# Patient Record
(demographics unavailable — no encounter records)

---

## 2024-11-13 NOTE — PHYSICAL EXAM

## 2024-11-13 NOTE — CARDIOLOGY SUMMARY
[de-identified] : 6/19/2024 - sinus rhythm 72 bpm, RBBB [de-identified] : On 2/11/2024 he had a nuclear stress test which was notable for moderate inferior and anteroseptal ischemia. [de-identified] : On 2/7/2024 Edgar Vallejo had a transthoracic echocardiogram which showed normal left ventricular systolic function with no wall motion abnormality, mild mitral regurgitation, mild tricuspid regurgitation, mitral valve prolapse, and a minimal pericardial effusion. [de-identified] : 10/22/2024. [facility].  Conclusions:  Diagnostic cath showed a severe lesion in the prox Diag branch. The vessel was overall small, however, given the patients angina POBA was performed with improved flow. The LAD had moderate disease that was assessed with iFR and determined to be non-significant at this time. The LVEDP during the case was 12 mmHg.

## 2024-11-13 NOTE — END OF VISIT
[Time Spent: ___ minutes] : I have spent [unfilled] minutes of time on the encounter which excludes teaching and separately reported services. [FreeTextEntry3] : I, To Stearns MD, personally performed the evaluation and management (E/M) services for this new patient. That E/M includes conducting the clinically appropriate initial history &/or exam, assessing all conditions, and establishing the plan of care. Today, Ilda Matta NP was here to observe my evaluation and management service for this patient & follow plan of care established by me going forward.

## 2024-11-13 NOTE — CARDIOLOGY SUMMARY
[de-identified] : 6/19/2024 - sinus rhythm 72 bpm, RBBB [de-identified] : On 2/11/2024 he had a nuclear stress test which was notable for moderate inferior and anteroseptal ischemia. [de-identified] : On 2/7/2024 Edgar Vallejo had a transthoracic echocardiogram which showed normal left ventricular systolic function with no wall motion abnormality, mild mitral regurgitation, mild tricuspid regurgitation, mitral valve prolapse, and a minimal pericardial effusion. [de-identified] : 10/22/2024. [facility].  Conclusions:  Diagnostic cath showed a severe lesion in the prox Diag branch. The vessel was overall small, however, given the patients angina POBA was performed with improved flow. The LAD had moderate disease that was assessed with iFR and determined to be non-significant at this time. The LVEDP during the case was 12 mmHg.

## 2024-11-13 NOTE — DISCUSSION/SUMMARY
[FreeTextEntry1] : He is a 78-year-old man who presents today for pretransplant cardiac evaluation prior to potential renal transplant with known history as above.   He is now status post diagnostic cath which showed a severe lesion in the proximal diagonal branch. The vessel was overall small, however, given the patient's angina POBA was performed with improved flow. The LAD had moderate disease that was assessed with iFR and determined to be non-significant at this time. He will continue DAPT with clopidogrel and aspirin to complete 3 weeks. After 11/12/2024 he will discontinue clopidogrel and continue on aspirin monotherapy indefinitely. Continue high intensity statin therapy.   No further cardiac testing is required prior to transplant consideration.

## 2024-11-13 NOTE — PHYSICAL EXAM

## 2024-11-13 NOTE — REASON FOR VISIT
[Other: ____] : [unfilled] [Other: _____] : [unfilled] [FreeTextEntry1] : 11/6/2024 Edgar Vallejo returns today for scheduled follow up. On 10/22/2024 he had a left heart catheterization which showed a severe lesion in the proximal diagonal branch. The vessel was overall small, however, given the patient's angina POBA was performed with improved flow. The LAD had moderate disease that was assessed with iFR and determined to be non-significant at this time. DAPT to complete 3 weeks will be completed on 11/12/2024. As he returns today he is feeling generally well. He reports no issues with his recently started peritoneal dialysis. He denies any chest discomfort, shortness of breath, palpitations, lightheadedness or syncope and continues to take his medications as directed.

## 2024-11-13 NOTE — HISTORY OF PRESENT ILLNESS
[FreeTextEntry1] : Edgar Vallejo presents today for pretransplant cardiac evaluation prior to potential renal transplant.   He is a 78-year-old with known cardiac risk factors of coronary artery disease (status post PCI with RICK ), prior stroke (CVA 2 years ago), chronic hypertension, dyslipidemia, being a former smoker (COPD, former smoker, quit 30 years ago, previously 1-1.5 PPD x 14 years), diabetes mellitus (DM2 diagnosed in , on insulin since 3 years ago, A1c 6.2%) and chronic renal disease (PD catheter in place, to begin PD in the near future).   Surgical history includes PD catheter placement, 1969 appendectomy.   His father is . He had CVA. Age 75-76. His mother is . She  same age.  He has 5 brothers and 3 sisters. To his knowledge they are all healthy.  Born in [facility]. He is . He has one son.  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   For exercise he rides a stationary bike for 2 hours a day.  He gets lightheaded when he is hypoglycemic, but otherwise no chest discomfort, shortness of breath, palpitations, lightheadedness or syncope.   He reports a recent echocardiogram and stress test with his primary cardiologist.

## 2024-11-13 NOTE — HISTORY OF PRESENT ILLNESS
[FreeTextEntry1] : Edgar Vallejo presents today for pretransplant cardiac evaluation prior to potential renal transplant.   He is a 78-year-old with known cardiac risk factors of coronary artery disease (status post PCI with RICK ), prior stroke (CVA 2 years ago), chronic hypertension, dyslipidemia, being a former smoker (COPD, former smoker, quit 30 years ago, previously 1-1.5 PPD x 14 years), diabetes mellitus (DM2 diagnosed in , on insulin since 3 years ago, A1c 6.2%) and chronic renal disease (PD catheter in place, to begin PD in the near future).   Surgical history includes PD catheter placement, 1969 appendectomy.   His father is . He had CVA. Age 75-76. His mother is . She  same age.  He has 5 brothers and 3 sisters. To his knowledge they are all healthy.  Born in [facility]. He is . He has one son.  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   For exercise he rides a stationary bike for 2 hours a day.  He gets lightheaded when he is hypoglycemic, but otherwise no chest discomfort, shortness of breath, palpitations, lightheadedness or syncope.   He reports a recent echocardiogram and stress test with his primary cardiologist.

## 2024-12-31 NOTE — HISTORY OF PRESENT ILLNESS
[FreeTextEntry1] : Pt c/o  bilateral  leg  intermittent claudication at sev  blocks and nightly  margareth leg and foot nocturnal cramps\par  about   2-3x/night \par  Onset sev years \par  Intensity severe\par  pt states that it is affecting his sleep\par  Pt denies recent injury, travel\par  \par  Pt denies any recent  cardiac c/o , SOB, Chest Pain or recent heart attacks \par  \par  \par  \par   [de-identified] : pt states intermittent claudication remains at 1-3   blocks remains  pt  states plavix was d/c  pt is in pletal 100 bid  pt is s/p lue avf creation and  intervention w Dr ANNA Sher MD pt was eval by  cardiologist for le edema  and was offered  varithena  therapy

## 2024-12-31 NOTE — ASSESSMENT
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  Varicose veins are enlarged, twisted veins. Varicose veins are caused by increased blood pressure in the veins.  The blood moves towards the heart by 1-way valves in the veins. When the valves become weakened or damaged, blood can collect in the veins and pool in your lower legs (ankles). This causes the veins to become enlarged and incompetent with reflux. Sitting or standing for long periods can cause blood to pool in the leg veins, increasing the pressure within the veins.  Risk factors for varicose veins or venous disease may include:  obesity, older age, standing or sitting for prolonged periods of time for several years, being female, pregnancy, taking oral contraceptive pills or hormone replacement, being inactive, and/or smoking.  The most common symptoms of varicose veins are sensations in the legs, such as a heavy feeling, burning, and/or aching. However, each individual may experience symptoms differently.  Other symptoms may include:  color changes in the skin, sores on the legs, or rash.  Severe varicose veins or venous disease may eventually produce long-term mild swelling that can result in more serious skin and tissue problems, such as ulcers and non-healing sores. Varicose veins and venous disease are diagnosed by a complete medical history, physical examination, and diagnostic studies for varicose veins including duplex ultrasound and color-flow imaging.   Medical treatment for varicose veins and venous disease include:  compression stockings, sclerotherapy, endovenous ablation and/or surgical treatment with microphlebectomy.

## 2024-12-31 NOTE — PHYSICAL EXAM
[2+] : left 2+ [Right Carotid Bruit] : right carotid bruit heard [Left Carotid Bruit] : left carotid bruit heard [1+] : left 1+ [Alert] : alert [Oriented to Person] : oriented to person [Oriented to Place] : oriented to place [Oriented to Time] : oriented to time [Calm] : calm [JVD] : no jugular venous distention  [Ankle Swelling (On Exam)] : present [Ankle Swelling Bilaterally] : bilaterally  [Varicose Veins Of Lower Extremities] : bilaterally [] : bilaterally [Ankle Swelling On The Right] : mild [de-identified] : nad [de-identified] : wnl [de-identified] : no resp distress [FreeTextEntry1] : Mild arterial insufficiency w mild to moderate  trophic skin changes  no wounds/ulcers lue avf w good bruit and thrill  Mild  bilateral leg venous insufficiency w mild  bilateral leg stasis dermatitis, hyperpigmentation in the gator area and mild bilateral leg edema Multiple  bilateral leg small tortuous varicose  veins and spider veins  ant post medial calf and shin RLE Varicose veins measuring  1-3mm in size on the calf /shin LLE Varicose veins measuring  1-3mm in size on the calf /shin no wounds/ulcers   [de-identified] : wnl [de-identified] : Jorge Cranial nerves 2-12 jorge grossly intact [de-identified] : cooperative

## 2024-12-31 NOTE — DATA REVIEWED
[FreeTextEntry1] : 10/6/2020  Carotid Duplex  Rt ICA 50% (171/43) stenosis  by velocity criteria                          Lt  ICA  less 50% (62/26) stenosis  by velocity criteria                          Jorge Ant Vertebral Arterial Flow   10/6/2020 CINTIA/PVR RLE mild infra geniculate and LLE mild infra geniculate arterial insuff                                   w vessel calcification                                   Rt CINTIA  1.32 Lt CINTIA  1.32                                   7/12/2022 Carotid Duplex  Rt ICA greater than 80% (615/278) stenosis  by velocity criteria                          Lt  ICA  less 50% (66/26) stenosis  by velocity criteria                          Jorge Ant Vertebral Arterial Flow   7/12/2022  CINTIA/PVR RLE mild infra geniculate and LLE mild infra geniculate arterial insuff                                   w vessel calcification                                   Rt CINTIA  1.34 Lt CINTIA  1.29                         12/20/2022   Carotid Duplex  Rt ICA occluded                           Lt  ICA  less 50% (121/36) stenosis  by velocity criteria                          Jorge Ant Vertebral Arterial Flow   7/25/2023 CINTIA/PVR RLE mild infra geniculate and LLE mild infra geniculate arterial insuff                                   w vessel calcification                                   Rt CINTIA  1.23 Lt CINTIA  1.24  12/26/2023  Carotid Duplex  Rt ICA recannalized w  medical string sign                                  (506/171 to 61/26)                          Lt  ICA  less 50% (157/28) stenosis  by velocity criteria                          Jorge Ant Vertebral Arterial Flow   8/27/2024 CINTIA/PVR RLE mild infra geniculate and                                  LLE mild infra geniculate arterial insuff                                   w vessel calcification                                  Rt CINTIA  1.25 Lt CINTIA  1.18  12/31/2024 Carotid Duplex  Rt ICA recannalized w  medical string sign                                  (323/66)                          Lt  ICA  less 50% (68/20) stenosis  by velocity criteria                          Jorge Ant Vertebral Arterial Flow

## 2025-02-18 NOTE — ASSESSMENT
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  and left ica stenosis clinically stable ,  venous insuff w sx, rle subacute dvt in pop vein and calf     Plan  conservative medical management - leg elevation, calf muscle exercises, weight management, diet control, knee high 15-20 mm hg compression stockings (rx given) continue pletal 100 bid continue plavix and asa rx start eliquis 5 mg BID (rx sent) pt does not want to follow up with cardiologist regarding lower extremities pt is planning lue  avf f/u w Dr Sher  ov w shaniqua/pvr s/o art insuff 12mo sept 2025 ov w carotid duplex s/o stenosis 12mo jan 2026 ov w  2 weeks to evaluate bilateral lower extremities w venous doppler s/o dvt     Letter faxed to Dr LOGAN Brooke DPM.  Varicose veins are enlarged, twisted veins. Varicose veins are caused by increased blood pressure in the veins.  The blood moves towards the heart by 1-way valves in the veins. When the valves become weakened or damaged, blood can collect in the veins and pool in your lower legs (ankles). This causes the veins to become enlarged and incompetent with reflux. Sitting or standing for long periods can cause blood to pool in the leg veins, increasing the pressure within the veins.  Risk factors for varicose veins or venous disease may include:  obesity, older age, standing or sitting for prolonged periods of time for several years, being female, pregnancy, taking oral contraceptive pills or hormone replacement, being inactive, and/or smoking.  The most common symptoms of varicose veins are sensations in the legs, such as a heavy feeling, burning, and/or aching. However, each individual may experience symptoms differently.  Other symptoms may include:  color changes in the skin, sores on the legs, or rash.  Severe varicose veins or venous disease may eventually produce long-term mild swelling that can result in more serious skin and tissue problems, such as ulcers and non-healing sores. Varicose veins and venous disease are diagnosed by a complete medical history, physical examination, and diagnostic studies for varicose veins including duplex ultrasound and color-flow imaging.   Medical treatment for varicose veins and venous disease include:  compression stockings, sclerotherapy, endovenous ablation and/or surgical treatment with microphlebectomy.     [Arterial/Venous Disease] : arterial/venous disease [Medication Management] : medication management [Foot care/Footwear] : foot care/footwear

## 2025-02-18 NOTE — PHYSICAL EXAM
[JVD] : no jugular venous distention  [2+] : left 2+ [Right Carotid Bruit] : right carotid bruit heard [Left Carotid Bruit] : left carotid bruit heard [1+] : left 1+ [Ankle Swelling (On Exam)] : present [Ankle Swelling Bilaterally] : bilaterally  [Varicose Veins Of Lower Extremities] : bilaterally [] : bilaterally [Ankle Swelling On The Right] : mild [No Rash or Lesion] : No rash or lesion [Alert] : alert [Oriented to Person] : oriented to person [Oriented to Place] : oriented to place [Oriented to Time] : oriented to time [Calm] : calm [de-identified] : nad [de-identified] : wnl [de-identified] : no resp distress [FreeTextEntry1] : Mild arterial insufficiency w mild to moderate  trophic skin changes  no wounds/ulcers lue avf w good bruit and thrill  Mild  bilateral leg venous insufficiency w mild  bilateral leg stasis dermatitis, hyperpigmentation in the gator area and mild bilateral leg edema Multiple  bilateral leg small tortuous varicose  veins and spider veins  ant post medial calf and shin RLE Varicose veins measuring  1-3mm in size on the calf /shin LLE Varicose veins measuring  1-3mm in size on the calf /shin no wounds/ulcers   [de-identified] : wnl [de-identified] : Jorge Cranial nerves 2-12 jorge grossly intact [de-identified] : cooperative

## 2025-02-18 NOTE — HISTORY OF PRESENT ILLNESS
[FreeTextEntry1] : Pt c/o  bilateral  leg  intermittent claudication at sev  blocks and nightly  margareth leg and foot nocturnal cramps\par  about   2-3x/night \par  Onset sev years \par  Intensity severe\par  pt states that it is affecting his sleep\par  Pt denies recent injury, travel\par  \par  Pt denies any recent  cardiac c/o , SOB, Chest Pain or recent heart attacks \par  \par  \par  \par   [de-identified] : pt is here to evaluate RLE discomfort denies leg swelling states his cardiologist performed RLE vein procedure in Dec 2025 has not followed up with his cardiologist since procedure he has been experiencing RLE pain in the calf and posterior thigh since then compliant with compression stockings states intermittent claudication remains at 1-3   blocks remains  pt  states plavix was d/c  pt is in pletal 100 bid  pt is s/p lue avf creation and  intervention w Dr ANNA Sher MD

## 2025-02-18 NOTE — DATA REVIEWED
[FreeTextEntry1] : 10/6/2020  Carotid Duplex  Rt ICA 50% (171/43) stenosis  by velocity criteria                          Lt  ICA  less 50% (62/26) stenosis  by velocity criteria                          Jorge Ant Vertebral Arterial Flow   10/6/2020 CINTIA/PVR RLE mild infra geniculate and LLE mild infra geniculate arterial insuff                                   w vessel calcification                                   Rt CINTIA  1.32 Lt CINTIA  1.32                                   7/12/2022 Carotid Duplex  Rt ICA greater than 80% (615/278) stenosis  by velocity criteria                          Lt  ICA  less 50% (66/26) stenosis  by velocity criteria                          Jorge Ant Vertebral Arterial Flow   7/12/2022  CINTIA/PVR RLE mild infra geniculate and LLE mild infra geniculate arterial insuff                                   w vessel calcification                                   Rt CINTIA  1.34 Lt CINTIA  1.29                         12/20/2022   Carotid Duplex  Rt ICA occluded                           Lt  ICA  less 50% (121/36) stenosis  by velocity criteria                          Jorge Ant Vertebral Arterial Flow   7/25/2023 CINTIA/PVR RLE mild infra geniculate and LLE mild infra geniculate arterial insuff                                   w vessel calcification                                   Rt CINTIA  1.23 Lt CINTIA  1.24  12/26/2023  Carotid Duplex  Rt ICA recannalized w  medical string sign                                  (506/171 to 61/26)                          Lt  ICA  less 50% (157/28) stenosis  by velocity criteria                          Jorge Ant Vertebral Arterial Flow   8/27/2024 CNITIA/PVR RLE mild infra geniculate and                                  LLE mild infra geniculate arterial insuff                                   w vessel calcification                                  Rt CINTIA  1.25 Lt CINTIA  1.18  12/31/2024 Carotid Duplex  Rt ICA recannalized w  medical string sign                                  (323/66)                          Lt  ICA  less 50% (68/20) stenosis  by velocity criteria                          Jorge Ant Vertebral Arterial Flow   2/18/2025 Jorge LE Venous Doppler                           RLE subacute dvt in distal popliteal vein short segment                                    subacute dvt in prox calf                                       gsv calf ablated, tributaries in calf                           LLE no acute dvt or svt                                   no sono evidence of insufficiency                                    tributaries in calf

## 2025-03-24 NOTE — PHYSICAL EXAM
[2+] : left 2+ [Right Carotid Bruit] : right carotid bruit heard [Left Carotid Bruit] : left carotid bruit heard [1+] : left 1+ [Ankle Swelling (On Exam)] : present [Ankle Swelling Bilaterally] : bilaterally  [Varicose Veins Of Lower Extremities] : bilaterally [] : bilaterally [Ankle Swelling On The Right] : mild [No Rash or Lesion] : No rash or lesion [Alert] : alert [Oriented to Person] : oriented to person [Oriented to Place] : oriented to place [Oriented to Time] : oriented to time [Calm] : calm [JVD] : no jugular venous distention  [de-identified] : nad [de-identified] : wnl [de-identified] : no resp distress [FreeTextEntry1] : Mild arterial insufficiency w mild to moderate  trophic skin changes  no wounds/ulcers lue avf w good bruit and thrill  Mild  bilateral leg venous insufficiency w mild  bilateral leg stasis dermatitis, hyperpigmentation in the gator area and mild bilateral leg edema Multiple  bilateral leg small tortuous varicose  veins and spider veins  ant post medial calf and shin RLE Varicose veins measuring  1-3mm in size on the calf /shin LLE Varicose veins measuring  1-3mm in size on the calf /shin no wounds/ulcers   [de-identified] : wnl [de-identified] : Jorge Cranial nerves 2-12 jorge grossly intact [de-identified] : cooperative

## 2025-03-24 NOTE — ASSESSMENT
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  and left ica stenosis clinically stable ,  venous insuff  clinically stable   Plan  conservative medical management - leg elevation, calf muscle exercises, weight management, diet control, knee high 15-20 mm hg compression stockings continue pletal 100 bid continue plavix and asa rx cleared for eliquis d/c from vasc surg standpoint  pt does not want to follow up with cardiologist regarding lower extremities pt is planning lue  avf f/u w Dr Sher  ov w AID s/o AAA 12 mo march 2026  ov w carotid duplex s/o stenosis 12mo jan 2026 ov w shaniqua/pvr s/o art insuff 12mo sept 2025   Letter faxed to Dr LOGAN Brooke DPM.  Varicose veins are enlarged, twisted veins. Varicose veins are caused by increased blood pressure in the veins.  The blood moves towards the heart by 1-way valves in the veins. When the valves become weakened or damaged, blood can collect in the veins and pool in your lower legs (ankles). This causes the veins to become enlarged and incompetent with reflux. Sitting or standing for long periods can cause blood to pool in the leg veins, increasing the pressure within the veins.  Risk factors for varicose veins or venous disease may include:  obesity, older age, standing or sitting for prolonged periods of time for several years, being female, pregnancy, taking oral contraceptive pills or hormone replacement, being inactive, and/or smoking.  The most common symptoms of varicose veins are sensations in the legs, such as a heavy feeling, burning, and/or aching. However, each individual may experience symptoms differently.  Other symptoms may include:  color changes in the skin, sores on the legs, or rash.  Severe varicose veins or venous disease may eventually produce long-term mild swelling that can result in more serious skin and tissue problems, such as ulcers and non-healing sores. Varicose veins and venous disease are diagnosed by a complete medical history, physical examination, and diagnostic studies for varicose veins including duplex ultrasound and color-flow imaging.   Medical treatment for varicose veins and venous disease include:  compression stockings, sclerotherapy, endovenous ablation and/or surgical treatment with microphlebectomy.

## 2025-03-24 NOTE — HISTORY OF PRESENT ILLNESS
[FreeTextEntry1] : Pt c/o  bilateral  leg  intermittent claudication at sev  blocks and nightly  margareth leg and foot nocturnal cramps\par  about   2-3x/night \par  Onset sev years \par  Intensity severe\par  pt states that it is affecting his sleep\par  Pt denies recent injury, travel\par  \par  Pt denies any recent  cardiac c/o , SOB, Chest Pain or recent heart attacks \par  \par  \par  \par   [de-identified] : pt states legs are feeling better  states his cardiologist performed RLE vein procedure in Dec 2025 has not followed up with his cardiologist since procedure he has been experiencing RLE pain in the calf and posterior thigh since then compliant with compression stockings states intermittent claudication remains at 1-3   blocks remains  pt is in pletal 100 bid  pt is s/p lue avf creation and  intervention w Dr ANNA Sher MD pt was recently admitted  to hospital and eliquis was d/c pt does not know why

## 2025-03-24 NOTE — DATA REVIEWED
[FreeTextEntry1] : 10/6/2020  Carotid Duplex  Rt ICA 50% (171/43) stenosis  by velocity criteria                          Lt  ICA  less 50% (62/26) stenosis  by velocity criteria                          Jorge Ant Vertebral Arterial Flow   10/6/2020 CINTIA/PVR RLE mild infra geniculate and LLE mild infra geniculate arterial insuff                                   w vessel calcification                                   Rt CINTIA  1.32 Lt CINTIA  1.32                                   7/12/2022 Carotid Duplex  Rt ICA greater than 80% (615/278) stenosis  by velocity criteria                          Lt  ICA  less 50% (66/26) stenosis  by velocity criteria                          Jorge Ant Vertebral Arterial Flow   7/12/2022  CINTIA/PVR RLE mild infra geniculate and LLE mild infra geniculate arterial insuff                                   w vessel calcification                                   Rt CINTIA  1.34 Lt CINTIA  1.29                         12/20/2022   Carotid Duplex  Rt ICA occluded                           Lt  ICA  less 50% (121/36) stenosis  by velocity criteria                          Jorge Ant Vertebral Arterial Flow   7/25/2023 CINTIA/PVR RLE mild infra geniculate and LLE mild infra geniculate arterial insuff                                   w vessel calcification                                   Rt CINTIA  1.23 Lt CINTIA  1.24  12/26/2023  Carotid Duplex  Rt ICA recannalized w  medical string sign                                  (506/171 to 61/26)                          Lt  ICA  less 50% (157/28) stenosis  by velocity criteria                          Jorge Ant Vertebral Arterial Flow   8/27/2024 CINTIA/PVR RLE mild infra geniculate and                                  LLE mild infra geniculate arterial insuff                                   w vessel calcification                                  Rt CINTIA  1.25 Lt CINTIA  1.18  12/31/2024 Carotid Duplex  Rt ICA recannalized w  medical string sign                                  (323/66)                          Lt  ICA  less 50% (68/20) stenosis  by velocity criteria                          Jorge Ant Vertebral Arterial Flow   2/18/2025 Jorge LE Venous Doppler                           RLE subacute dvt in distal popliteal vein short segment                                    subacute dvt in prox calf                                       gsv calf ablated, tributaries in calf                           LLE no acute dvt or svt                                   no sono evidence of insufficiency                                    tributaries in calf    nov 2024 CT of abd /pelvis sig for AAA 4.4  x 5 cm   3/24/2025  Jorge LE Venous Doppler                           RLE chronic occ  dvt in  bk pop v, prox calf perf                            LLE  sig for insuff SFV                                     Bakers cyst 2.6 cm   3/24/2025  [facility]o Iliac Duplex   AAA  4.5  cm , Rt CIAA  1.5  cm, Lt CIAA  1.0 cm